# Patient Record
Sex: FEMALE | Race: BLACK OR AFRICAN AMERICAN
[De-identification: names, ages, dates, MRNs, and addresses within clinical notes are randomized per-mention and may not be internally consistent; named-entity substitution may affect disease eponyms.]

---

## 2018-12-14 ENCOUNTER — HOSPITAL ENCOUNTER (EMERGENCY)
Dept: HOSPITAL 74 - JERFT | Age: 28
Discharge: HOME | End: 2018-12-14
Payer: SELF-PAY

## 2018-12-14 VITALS — SYSTOLIC BLOOD PRESSURE: 100 MMHG | HEART RATE: 83 BPM | DIASTOLIC BLOOD PRESSURE: 63 MMHG | TEMPERATURE: 98.9 F

## 2018-12-14 VITALS — BODY MASS INDEX: 29.2 KG/M2

## 2018-12-14 DIAGNOSIS — H10.33: Primary | ICD-10-CM

## 2018-12-14 DIAGNOSIS — J02.0: ICD-10-CM

## 2018-12-14 NOTE — PDOC
History of Present Illness





- General


Chief Complaint: Cold Symptoms


Stated Complaint: COLD SYMPTOMS,PINK EYE


Time Seen by Provider: 12/14/18 09:42


History Source: Patient


Exam Limitations: No Limitations





- History of Present Illness


Initial Comments: 





12/14/18 12:35


Patient is a 27-year-old female in no past medical history who presents to 

emergency department today with 3 days of sore throat and pinkeye. Patient 

states that her daughter was recently diagnosed with strep throat and she is 

concerned she has the same. She states that it is painful to swallow. She also 

states that her eyes have been pink bilaterally for 3 days. Denies fevers, 

chills, cough, runny nose, nausea vomiting and diarrhea.





Past History





- Travel


Traveled outside of the country in the last 30 days: No


Close contact w/someone who was outside of country & ill: No





- Past Medical History


Allergies/Adverse Reactions: 


 Allergies











Allergy/AdvReac Type Severity Reaction Status Date / Time


 


No Known Allergies Allergy   Verified 12/14/18 09:14











Home Medications: 


Ambulatory Orders





Amoxicillin - [Amoxicillin 500mg Capsule -] 500 mg PO BID #14 capsule 12/14/18 


Ofloxacin 0.3% Ophth Soln [Ocuflox -] 1 drop OP Q4H #100 drops 12/14/18 








CVA: No


COPD: No





- Suicide/Smoking/Psychosocial Hx


Smoking History: Never smoked


Have you smoked in the past 12 months: No


Information on smoking cessation initiated: No


Hx Alcohol Use: No


Drug/Substance Use Hx: No





**Review of Systems





- Review of Systems


Able to Perform ROS?: Yes


Comments:: 





12/14/18 12:36


CONSTITUTIONAL: 


Absent: fever, chills, diaphoresis, generalized weakness, malaise, loss of 

appetite


HEENT: 


Present: sore throat, pink eye Absent: rhinorrhea, nasal congestion,  throat 

swelling, difficulty swallowing, mouth swelling, ear pain, eye pain, visual 

Changes


CARDIOVASCULAR: 


Absent: chest pain, loss of consciousness, palpitations, irregular heart rate, 

peripheral edema


RESPIRATORY: 


Absent: cough, shortness of breath, dyspnea with exertion, orthopnea, wheezing, 

stridor, hemoptysis


GASTROINTESTINAL:


Absent: abdominal pain, abdominal distension, nausea, vomiting, diarrhea, 

constipation, melena, hematochezia


GENITOURINARY: 


Absent: dysuria, frequency, urgency, hesitancy, hematuria, flank pain, genital 

pain


MUSCULOSKELETAL: 


Absent: myalgia, arthralgia, joint swelling


SKIN: 


Absent: rash, itching, pallor


HEMATOLOGIC/IMMUNOLOGIC: 


Absent: easy bleeding, easy bruising, lymphadenopathy, frequent infections


ENDOCRINE:


Absent: unexplained weight gain, unexplained weight loss, heat intolerance, 

cold intolerance


NEUROLOGIC: 


Absent: headache, focal weakness or paresthesias, dizziness, unsteady gait, 

seizure, mental status changes, bladder or bowel incontinence


PSYCHIATRIC: 


Absent: anxiety, depression, suicidal or homicidal ideation, hallucinations.


Is the patient limited English proficient: No





*Physical Exam





- Vital Signs


 Last Vital Signs











Temp Pulse Resp BP Pulse Ox


 


 98.9 F   83   16   100/63   100 


 


 12/14/18 09:13  12/14/18 09:13  12/14/18 09:13  12/14/18 09:13  12/14/18 09:13














- Physical Exam


Comments: 





12/14/18 12:36


GENERAL:


Well developed, well nourished. Awake and alert. No acute distress.


HEENT:


Normocephalic, atraumatic. PERRLA, EOMI. No conjunctival pallor. Sclera are non-

icteric.  Sclera is erythematous b/l. Visual acuity is 20/20 OU. Moist mucous 

membranes. Oropharynx is erythematous without exudate or edema. Uvula is midline


NECK: 


Supple. Full ROM. No JVD. Carotid pulses 2+ and symmetric, without bruits. No 

thyromegaly. No lymphadenopathy.


CARDIOVASCULAR:


Regular rate and rhythm. No murmurs, rubs, or gallops. Distal pulses are 2+ and 

symmetric. 


PULMONARY: 


No evidence of respiratory distress. Lungs clear to auscultation bilaterally. 

No wheezing, rales or rhonchi.


ABDOMINAL:


Soft. Non-tender. Non-distended. No rebound or guarding. No organomegaly. 

Normoactive bowel sounds. 


MUSCULOSKELETAL 


Normal range of motion at all joints. No bony deformities or tenderness. No CVA 

tenderness.


EXTREMITIES: 


No cyanosis. No clubbing. No edema. No calf tenderness.


SKIN: 


Warm and dry. Normal capillary refill. No rashes. No jaundice. 


NEUROLOGICAL: 


Alert, awake, appropriate. Cranial nerves 2-12 intact. No deficits to light 

touch and temperature in face, upper extremities and lower extremities. No 

motor deficits in the in face, upper extremities and lower extremities. 

Normoreflexic in the upper and lower extremities. Normal speech. Toes are down-

going bilaterally. Gait is normal without ataxia.


PSYCHIATRIC: 


Cooperative. Good eye contact. Appropriate mood and affect.





Moderate Sedation





- Procedure Monitoring


Vital Signs: 


Procedure Monitoring Vital Signs











Temperature  98.9 F   12/14/18 09:13


 


Pulse Rate  83   12/14/18 09:13


 


Respiratory Rate  16   12/14/18 09:13


 


Blood Pressure  100/63   12/14/18 09:13


 


O2 Sat by Pulse Oximetry (%)  100   12/14/18 09:13











ED Treatment Course





- Medications


Given in the ED: 


ED Medications














Discontinued Medications














Generic Name Dose Route Start Last Admin





  Trade Name Rejiq  PRN Reason Stop Dose Admin


 


Ibuprofen  600 mg  12/14/18 10:04  12/14/18 10:11





  Motrin -  PO  12/14/18 10:05  600 mg





  ONCE ONE   Administration





     





     





     





     














Medical Decision Making





- Medical Decision Making





12/14/18 13:37


Patient's rapid strep testing is positive at this time. We'll treat with 

amoxicillin.


Given patient is positive strep throat we'll treat this as a bacterial 

conjunctivitis. Ofloxacin drops given.


Supportive therapy recommended. Patient to follow-up with her PCP


DC home





I discussed the physical exam findings, ancillary test results and final 

diagnoses with the patient. I answered all of the patient's questions. The 

patient was satisfied with the care received and felt comfortable with the 

discharge plan and treatment plan.  The Patient agrees to follow up with the 

primary care physician/specialist within 24-72 hours. Return precautions were 

given.





*DC/Admit/Observation/Transfer


Diagnosis at time of Disposition: 


 Strep throat





Conjunctivitis


Qualifiers:


 Conjunctivitis type: acute Acute conjunctivitis type: unspecified Laterality: 

bilateral Qualified Code(s): H10.33 - Unspecified acute conjunctivitis, 

bilateral








- Discharge Dispostion


Disposition: HOME


Condition at time of disposition: Stable


Decision to Admit order: No





- Prescriptions


Prescriptions: 


Amoxicillin - [Amoxicillin 500mg Capsule -] 500 mg PO BID #14 capsule


Ofloxacin 0.3% Ophth Soln [Ocuflox -] 1 drop OP Q4H #100 drops





- Referrals


Referrals: 


Raphael Hathaway MD [Staff Physician] - 





- Patient Instructions


Printed Discharge Instructions:  DI for Strep Throat, DI for Conjunctivitis


Additional Instructions: 


You have strep throat. This is a bacterial infection.


Please take the amoxicillin 500 mg twice a day for one week. Please finish the 

prescription even if you feel better.


You may take Motrin 800 mg every 8 hours as needed for pain or fever.


Warm water gargles and cough drops and just may also help her symptoms.


Please throw way your toothbrush 3 days into treatment to prevent reinfection.


Please follow up with your primary care doctor next week.





Return to emergency department if you have worsening pain, difficulty swallowing

, changes in your voice, lightheadedness, dizziness, or any changes in your 

symptoms.





You also have conjunctivitis


Use the eye drops as prescribed to both eyes


Warm compresses may help your symptoms as well


Follow up with your primary care doctor





- Post Discharge Activity


Forms/Work/School Notes:  Back to Work